# Patient Record
Sex: MALE | Race: WHITE | NOT HISPANIC OR LATINO
[De-identification: names, ages, dates, MRNs, and addresses within clinical notes are randomized per-mention and may not be internally consistent; named-entity substitution may affect disease eponyms.]

---

## 2024-01-26 ENCOUNTER — APPOINTMENT (OUTPATIENT)
Dept: ORTHOPEDIC SURGERY | Facility: CLINIC | Age: 59
End: 2024-01-26
Payer: COMMERCIAL

## 2024-01-26 PROCEDURE — 73030 X-RAY EXAM OF SHOULDER: CPT | Mod: LT

## 2024-01-26 PROCEDURE — 99204 OFFICE O/P NEW MOD 45 MIN: CPT

## 2024-01-26 NOTE — PHYSICAL EXAM
[Left] : left shoulder [Type 2 acromion] : Type 2 acromion [de-identified] : Constitutional: The general appearance of the patient is well developed, well nourished, no deformities and well groomed. Normal  Gait: Gait and function is as follows: normal gait.  Skin: Head and neck visualized skin is normal. Left upper extremity visualized skin is normal. Right upper extremity visualized skin is normal. Thoracic Skin of the thoracic spine shows visualized skin is normal.  Cardiovascular: palpable radial pulse bilaterally, good capillary refill in digits of the bilateral upper extremities and no temperature or color changes in the bilateral upper extremities.  Lymphatic: Normal Palpation of lymph nodes in the cervical.  Neurologic: fine motor control in the bilateral upper extremities is intact. Deep Tendon Reflexes in Upper and Lower Extremities Negative Navarro's in the bilateral upper extremities. The patient is oriented to time, place and person. Sensation to light touch intact in the bilateral upper extremities. Mood and Affect is normal.  Right Shoulder: Inspection of the shoulder/upper arm is as follows: There is a full, pain-free, stable range of motion of the shoulder with normal strength and no tenderness to palpation.  Left Shoulder: Inspection of the shoulder/upper arm is as follows: no scapula winging, no biceps deformity and no AC joint deformity. Palpation of the shoulder/upper arm is as follows: There is tenderness at the proximal biceps tendon. Range of motion of the shoulder is as follows: Pain with internal rotation, external rotation, abduction and forward flexion. Strength of the shoulder is as follows: Supraspinatus 4/5. External Rotation 4/5. Internal Rotation 4/5. Deltoid 5/5 Ligament Stability and Special Tests of the shoulder is as follows: Neer test is positive. Siddiqui' test is positive. Speed's test is positive.  Neck:  Inspection / Palpation of the cervical spine is as follows: mild paracervical tenderness. Range of motion of the cervical spine is as follows: moderately decreased range of motion of the cervical spine. Stability testing for the cervical spine is as follows Stable range of motion.  Back, including spine: Inspection / Palpation of the thoracic/lumbar spine is as follows: There is a full, pain free, stable range of motion of the thoracic spine with a normal tone and not tenderness to palpation..

## 2024-01-26 NOTE — HISTORY OF PRESENT ILLNESS
[de-identified] : 58-year-old male presenting with progressively worsening left shoulder pain for many years.  Admits to multiple traumatic injuries in which she felt a pop and ripping sensation to his left shoulder.  He reports pain is moderate, constant, 6 out of 10.  Pain is located to the lateral aspect of the shoulder.  Has noticed progressively worsening weakness with movement away from his body.  Patient has history of right rotator cuff repair many years ago and states current level of pain to the left shoulder is identical.  He has been completing home exercise program and physical therapy with no improvement.  Pain is worse with motion overhead. Patient is RHD, no PMhx, retired [employer]

## 2024-01-26 NOTE — DISCUSSION/SUMMARY
[de-identified] : LUE + Neer, + Siddiqui Pain with resisted abdcution Painful arc pain and weakness with cuff testing, + Drop arm test +TTP LHBT, + Speed   58-year-old male presenting with progressively worsening left shoulder pain for many years. Xrays are non diagnostic  MRI to further evaluate rotator cuff Based on the patients history and physical exam findings, I am concerned about the possibility of a full-thickness rotator cuff tear. The patient has pain and subjective weakness consistent with this diagnosis. Therefore, I recommend an MRI to evaluate for a rotator cuff tear. This will also aid in evaluating for injury to the biceps labral complex and for any signs of impingement. The patient will follow-up after MRI to discuss further treatment options.  Patient was instructed to call when the MRI is done and I will go over the results over the phone with him.    (1) We discussed a comprehensive treatment plans that included a prescription management plan involving the use of prescription strength medications to include Ibuprofen 600-800 mg TID, versus 500-650 mg Tylenol. We also discussed prescribing topical diclofenac (Voltaren gel) as well as once daily Meloxicam 15 mg. (2) The patient has More Than One chronic injuries/illnesses as outlined, discussed, and documented by ICD 10 codes listed, as well as the HPI and Plan section. There is a moderate risk of morbidity with further treatment, especially from use of prescription strength medications and possible side effects of these medications which include upset stomach and cardiac/renal issues with long term use were discussed. (3) I recommended that the patient follow-up with their medical physician to discuss any significant specific potential issues with long term use such as interactions with current medications or with exacerbation of underlying medical morbidities.   Attestation: I, Grace Ruiz , attest that this documentation has been prepared under the direction and in the presence of Provider Jose Pond MD. The documentation recorded by the scribe, in my presence, accurately reflects the service I personally performed, and the decisions made by me with my edits as appropriate. Jose Pond MD

## 2024-01-29 ENCOUNTER — APPOINTMENT (OUTPATIENT)
Dept: MRI IMAGING | Facility: CLINIC | Age: 59
End: 2024-01-29

## 2024-03-12 ENCOUNTER — RESULT REVIEW (OUTPATIENT)
Age: 59
End: 2024-03-12

## 2024-03-18 ENCOUNTER — APPOINTMENT (OUTPATIENT)
Dept: ORTHOPEDIC SURGERY | Facility: CLINIC | Age: 59
End: 2024-03-18
Payer: COMMERCIAL

## 2024-03-18 DIAGNOSIS — S46.012A STRAIN OF MUSCLE(S) AND TENDON(S) OF THE ROTATOR CUFF OF LEFT SHOULDER, INITIAL ENCOUNTER: ICD-10-CM

## 2024-03-18 DIAGNOSIS — M75.52 BURSITIS OF LEFT SHOULDER: ICD-10-CM

## 2024-03-18 DIAGNOSIS — M25.512 PAIN IN LEFT SHOULDER: ICD-10-CM

## 2024-03-18 DIAGNOSIS — M75.42 IMPINGEMENT SYNDROME OF LEFT SHOULDER: ICD-10-CM

## 2024-03-18 PROCEDURE — 99214 OFFICE O/P EST MOD 30 MIN: CPT

## 2024-03-18 NOTE — DATA REVIEWED
[FreeTextEntry1] : MRI left shoulder [facility] 3/12/24 Full-thickness tear extending to the anterior aspect of supraspinatus insertion measuring up to 1.2 cm in AP dimension. Additional small vertically oriented tear is seen just proximal to the posterior aspect of supraspinatus insertion.  Moderate infraspinatus tendinosis with shallow articular sided tearing centrally and a minimal intrasubstance tear at the myotendinous junction.  Moderate AC joint arthrosis.

## 2024-03-18 NOTE — DISCUSSION/SUMMARY
[de-identified] : LUE + Neer, + Siddiqui Pain with resisted abdcution Painful arc pain and weakness with cuff testing, + Drop arm test +TTP LHBT, + Speed   58-year-old male presenting with progressively worsening left shoulder pain for many years. Prior x-rays are non diagnostic  MRI demonstrates full-thickness tear extending to the anterior aspect of supraspinatus insertion measuring up to 1.2 cm in AP dimension. Surgery discussed which would be an arthroscopic rotator cuff repair and biceps tenodesis  Follow up pre op apt   (Left)  Rotator Cuff Tear Discussion for Patient Requesting Surgery. Pt has significant rotator cuff tear as injury to the biceps-labral complex and continues to report pain and weakness despite appropriate conservative treatment including focused HEP/therapy, injections, anti-inflammatory medication and activity modification. The patient is interested in pursuing surgical treatment. We had a lengthy discussion about the nature of arthroscopic rotator cuff repair surgery including the likelihood of addressing other pathology with arthroscopic vs mini-open biceps tenodesis, subacromial decompression, and extensive debridement of any pathologic tissue encountered at the time of surgery.  I discussed the risks and benefits of both operative and non-operative treatment. I explained in detail the postoperative recovery including the duration of sling use and expectation for postoperative physical therapy. Explained that there is no guarantee however there is a high likelihood of functional improvement and pain relief. The patient understood all this was discussed.  The patient is indicated for a Left shoulder arthroscopic rotator cuff repair, biceps tenodesis, and subacromial decompression.  * Surgery: Considering patient has failed all conservative treatment we are requesting authorization for: -	Left shoulder arthroscopic rotator cuff repair (CPT code 92355), biceps tenodesis (CPT 39310), debridement (CPT 06109) and Subacomial decompression (55051) -	 Pt would like surgery (next few weeks) The patient will require a Jodi Arc 2.0 brace, cryotherapy, and 12 weeks of post-operative physical therapy. The patient will require a medical clearance  The doctor will require the [employer] representative, one hour, and one assistant.     (1) We discussed a comprehensive treatment plans that included a prescription management plan involving the use of prescription strength medications to include Ibuprofen 600-800 mg TID, versus 500-650 mg Tylenol. We also discussed prescribing topical diclofenac (Voltaren gel) as well as once daily Meloxicam 15 mg. (2) The patient has More Than One chronic injuries/illnesses as outlined, discussed, and documented by ICD 10 codes listed, as well as the HPI and Plan section. There is a moderate risk of morbidity with further treatment, especially from use of prescription strength medications and possible side effects of these medications which include upset stomach and cardiac/renal issues with long term use were discussed. (3) I recommended that the patient follow-up with their medical physician to discuss any significant specific potential issues with long term use such as interactions with current medications or with exacerbation of underlying medical morbidities.   Attestation: I, Grace Ruiz , attest that this documentation has been prepared under the direction and in the presence of Provider Jose Pond MD. The documentation recorded by the scribe, in my presence, accurately reflects the service I personally performed, and the decisions made by me with my edits as appropriate. Jose Pond MD

## 2024-03-18 NOTE — PHYSICAL EXAM
[Left] : left shoulder [Type 2 acromion] : Type 2 acromion [de-identified] : Constitutional: The general appearance of the patient is well developed, well nourished, no deformities and well groomed. Normal  Gait: Gait and function is as follows: normal gait.  Skin: Head and neck visualized skin is normal. Left upper extremity visualized skin is normal. Right upper extremity visualized skin is normal. Thoracic Skin of the thoracic spine shows visualized skin is normal.  Cardiovascular: palpable radial pulse bilaterally, good capillary refill in digits of the bilateral upper extremities and no temperature or color changes in the bilateral upper extremities.  Lymphatic: Normal Palpation of lymph nodes in the cervical.  Neurologic: fine motor control in the bilateral upper extremities is intact. Deep Tendon Reflexes in Upper and Lower Extremities Negative Navarro's in the bilateral upper extremities. The patient is oriented to time, place and person. Sensation to light touch intact in the bilateral upper extremities. Mood and Affect is normal.  Right Shoulder: Inspection of the shoulder/upper arm is as follows: There is a full, pain-free, stable range of motion of the shoulder with normal strength and no tenderness to palpation.  Left Shoulder: Inspection of the shoulder/upper arm is as follows: no scapula winging, no biceps deformity and no AC joint deformity. Palpation of the shoulder/upper arm is as follows: There is tenderness at the proximal biceps tendon. Range of motion of the shoulder is as follows: Pain with internal rotation, external rotation, abduction and forward flexion. Strength of the shoulder is as follows: Supraspinatus 4/5. External Rotation 4/5. Internal Rotation 4/5. Deltoid 5/5 Ligament Stability and Special Tests of the shoulder is as follows: Neer test is positive. Siddiqui' test is positive. Speed's test is positive.  Neck:  Inspection / Palpation of the cervical spine is as follows: mild paracervical tenderness. Range of motion of the cervical spine is as follows: moderately decreased range of motion of the cervical spine. Stability testing for the cervical spine is as follows Stable range of motion.  Back, including spine: Inspection / Palpation of the thoracic/lumbar spine is as follows: There is a full, pain free, stable range of motion of the thoracic spine with a normal tone and not tenderness to palpation..

## 2024-03-18 NOTE — HISTORY OF PRESENT ILLNESS
[de-identified] : 58-year-old male presenting with progressively worsening left shoulder pain for many years.  Admits to multiple traumatic injuries in which she felt a pop and ripping sensation to his left shoulder.  He reports pain is moderate, constant, 6 out of 10.  Pain is located to the lateral aspect of the shoulder.  Has noticed progressively worsening weakness with movement away from his body.  Patient has history of right rotator cuff repair many years ago and states current level of pain to the left shoulder is identical.  He has been completing home exercise program and physical therapy with no improvement.  Pain is worse with motion overhead. Patient is RHD, no PMhx, retired [employer]  3/18/24: Patient here for left shoulder pain. Pt here for MRI review. Pt reports the pain has not improved since last visit